# Patient Record
Sex: FEMALE | Race: BLACK OR AFRICAN AMERICAN | NOT HISPANIC OR LATINO | URBAN - METROPOLITAN AREA
[De-identification: names, ages, dates, MRNs, and addresses within clinical notes are randomized per-mention and may not be internally consistent; named-entity substitution may affect disease eponyms.]

---

## 2018-06-22 ENCOUNTER — TESTING ONLY (OUTPATIENT)
Dept: URBAN - METROPOLITAN AREA CLINIC 11 | Facility: CLINIC | Age: 73
End: 2018-06-22
Payer: COMMERCIAL

## 2018-06-22 PROCEDURE — 92083 EXTENDED VISUAL FIELD XM: CPT | Performed by: OPHTHALMOLOGY

## 2018-06-22 ASSESSMENT — VISUAL ACUITY: OD: 20/40

## 2018-07-17 ENCOUNTER — OFFICE VISIT (OUTPATIENT)
Dept: URBAN - METROPOLITAN AREA CLINIC 11 | Facility: CLINIC | Age: 73
End: 2018-07-17
Payer: COMMERCIAL

## 2018-07-17 PROCEDURE — 92012 INTRM OPH EXAM EST PATIENT: CPT | Performed by: OPTOMETRIST

## 2018-07-17 ASSESSMENT — INTRAOCULAR PRESSURE
OD: 13
OS: 15

## 2018-07-17 NOTE — IMPRESSION/PLAN
Impression: Diagnosis: Chronic angle-closure glaucoma, bilateral, moderate stage. Code: R14.3682. s/p LPI; Tmax 25/25; /593; OCT 8/17 9/8 80/73(60/69), HVF 1/15 OD full OS nasal depression. 
SLT OU Plan: Continue [facility] OU QHS

orig IOP 25/25(18/20), Daniel IOP 17/15, OCT 77/70(80/73), VF OD borderline unreliable OS inf ericka

## 2018-11-27 ENCOUNTER — OFFICE VISIT (OUTPATIENT)
Dept: URBAN - METROPOLITAN AREA CLINIC 11 | Facility: CLINIC | Age: 73
End: 2018-11-27
Payer: COMMERCIAL

## 2018-11-27 PROCEDURE — 92012 INTRM OPH EXAM EST PATIENT: CPT | Performed by: OPTOMETRIST

## 2018-11-27 RX ORDER — LATANOPROST 50 UG/ML
0.005 % SOLUTION OPHTHALMIC
Qty: 3 | Refills: 3 | Status: INACTIVE
Start: 2018-11-27 | End: 2020-05-27

## 2018-11-27 ASSESSMENT — INTRAOCULAR PRESSURE
OS: 15
OD: 13

## 2018-11-27 NOTE — IMPRESSION/PLAN
Impression: Diagnosis: Chronic angle-closure glaucoma, bilateral, moderate stage. Code: L68.5883. s/p LPI; Tmax 25/25; /593; OCT 8/17 9/8 80/73(60/69), HVF 1/15 OD full OS nasal depression. 
SLT OU    IOP stable OU Plan: Continue [facility]igan OU QHS

orig IOP 25/25(18/20), Daniel IOP 17/15, OCT 77/70(80/73), VF OD borderline unreliable OS inf ericka

## 2018-11-27 NOTE — IMPRESSION/PLAN
Impression: Combined forms of age-related cataract, bilateral: H25.813. Plan: Discussed diagnosis in detail with patient. No treatment is required at this time. Will continue to observe condition and or symptoms. Call if [address] worsens.   Refraction PRN

## 2018-11-27 NOTE — IMPRESSION/PLAN
Impression: Type 2 diabetes mellitus w/o complication: M59.5. Plan: No signs of retinopathy or neovascularization noted. Discussed ocular and systemic benefits of blood sugar control.  RTC 1yr complete exam

## 2019-03-19 ENCOUNTER — OFFICE VISIT (OUTPATIENT)
Dept: URBAN - METROPOLITAN AREA CLINIC 11 | Facility: CLINIC | Age: 74
End: 2019-03-19
Payer: COMMERCIAL

## 2019-03-19 PROCEDURE — 92133 CPTRZD OPH DX IMG PST SGM ON: CPT | Performed by: OPTOMETRIST

## 2019-03-19 PROCEDURE — 99214 OFFICE O/P EST MOD 30 MIN: CPT | Performed by: OPTOMETRIST

## 2019-03-19 ASSESSMENT — INTRAOCULAR PRESSURE
OS: 12
OD: 17
OS: 16
OD: 10

## 2019-03-19 NOTE — IMPRESSION/PLAN
Impression: Diagnosis: Chronic angle-closure glaucoma, bilateral, moderate stage. Code: Y62.0368. s/p LPI; Tmax 25/25; /593; OCT 8/17 9/8 80/73(60/69), HVF 1/15 OD full OS nasal depression. 
SLT OU   borderline IOP OU Plan: Continue Lumigan OU QHS

orig IOP 25/25(18/20), Daniel IOP 17/15, OCT 77/69(77/70)(80/73), VF OD borderline unreliable OS inf ericka

## 2019-05-17 ENCOUNTER — TESTING ONLY (OUTPATIENT)
Dept: URBAN - METROPOLITAN AREA CLINIC 11 | Facility: CLINIC | Age: 74
End: 2019-05-17
Payer: COMMERCIAL

## 2019-05-17 DIAGNOSIS — Z13.1 ENCOUNTER FOR SCREENING FOR DIABETES MELLITUS: Primary | ICD-10-CM

## 2019-05-17 PROCEDURE — 92250 FUNDUS PHOTOGRAPHY W/I&R: CPT | Performed by: OPTOMETRIST

## 2019-05-17 ASSESSMENT — INTRAOCULAR PRESSURE
OS: 13
OD: 12

## 2020-01-27 ENCOUNTER — OFFICE VISIT (OUTPATIENT)
Dept: URBAN - METROPOLITAN AREA CLINIC 11 | Facility: CLINIC | Age: 75
End: 2020-01-27
Payer: COMMERCIAL

## 2020-01-27 DIAGNOSIS — H04.123 DRY EYE SYNDROME OF BILATERAL LACRIMAL GLANDS: ICD-10-CM

## 2020-01-27 DIAGNOSIS — H40.2232 CHRONIC ANGLE-CLOSURE GLAUCOMA, BILATERAL, MODERATE STAGE: Primary | ICD-10-CM

## 2020-01-27 PROCEDURE — 92012 INTRM OPH EXAM EST PATIENT: CPT | Performed by: OPTOMETRIST

## 2020-01-27 RX ORDER — BIMATOPROST 0.1 MG/ML
0.01 % SOLUTION/ DROPS OPHTHALMIC
Qty: 1 | Refills: 9 | Status: INACTIVE
Start: 2020-01-27 | End: 2020-05-27

## 2020-01-27 ASSESSMENT — INTRAOCULAR PRESSURE
OS: 15
OD: 15

## 2020-01-27 NOTE — IMPRESSION/PLAN
Impression: Diagnosis: Chronic angle-closure glaucoma, bilateral, moderate stage. Code: H34.8854. s/p LPI; Tmax 25/25; /593; OCT 8/17 9/8 80/73(60/69), HVF 1/15 OD full OS nasal depression. SLT OU   borderline IOP OU Plan: IOP at good level today ou. Continue Lumigan OU QHS. f/u 4mns IOP, [place], OCT Tmax IOP 25/22(18/20), Daniel IOP 17/15, OCT 77/69(77/70)(80/73), VF OD borderline unreliable OS inf ericka

## 2020-01-27 NOTE — IMPRESSION/PLAN
Impression: Dry eye syndrome of bilateral lacrimal glands: H04.123. Plan: Recommend art tears tid ou. Sample given.

## 2020-05-27 ENCOUNTER — OFFICE VISIT (OUTPATIENT)
Dept: URBAN - METROPOLITAN AREA CLINIC 11 | Facility: CLINIC | Age: 75
End: 2020-05-27
Payer: COMMERCIAL

## 2020-05-27 DIAGNOSIS — H25.813 COMBINED FORMS OF AGE-RELATED CATARACT, BILATERAL: ICD-10-CM

## 2020-05-27 DIAGNOSIS — E11.9 TYPE 2 DIABETES MELLITUS W/O COMPLICATION: ICD-10-CM

## 2020-05-27 PROCEDURE — 92014 COMPRE OPH EXAM EST PT 1/>: CPT | Performed by: OPTOMETRIST

## 2020-05-27 PROCEDURE — 92133 CPTRZD OPH DX IMG PST SGM ON: CPT | Performed by: OPTOMETRIST

## 2020-05-27 RX ORDER — LATANOPROST 50 UG/ML
0.005 % SOLUTION OPHTHALMIC
Qty: 3 | Refills: 3 | Status: ACTIVE
Start: 2020-05-27

## 2020-05-27 ASSESSMENT — INTRAOCULAR PRESSURE
OD: 14
OS: 16

## 2020-05-27 NOTE — IMPRESSION/PLAN
Impression: Type 2 diabetes mellitus w/o complication: X56.4. Plan: No signs of retinopathy or neovascularization noted. Discussed ocular and systemic benefits of blood sugar control.  RTC 1yr complete exam

## 2020-05-27 NOTE — IMPRESSION/PLAN
Impression: Combined forms of age-related cataract, bilateral: H25.813. Plan: Discussed diagnosis in detail with patient. No treatment is required at this time. Will continue to observe condition and or symptoms. Call if [place] worsens.   Refraction PRN

## 2020-05-27 NOTE — IMPRESSION/PLAN
Impression: Diagnosis: Chronic angle-closure glaucoma, bilateral, moderate stage. Code: B55.3859. s/p LPI; Tmax 25/25; /593; OCT 05/20 8/6 (77/67)(80/73(60/69), HVF 1/15 OD full OS nasal depression. SLT OU   borderline IOP OU Plan: OCT RNFL ordered and preformed today. Thinning OU. IOP at good level today ou. Continue Lumigan OU QHS.  Schedule VF 24-2. f/u 4mns IOP VF

## 2020-05-27 NOTE — IMPRESSION/PLAN
Impression: Dry eye syndrome of bilateral lacrimal glands: H04.123. Plan: Continue art tears tid ou.